# Patient Record
Sex: MALE | Race: WHITE | NOT HISPANIC OR LATINO | Employment: OTHER | URBAN - METROPOLITAN AREA
[De-identification: names, ages, dates, MRNs, and addresses within clinical notes are randomized per-mention and may not be internally consistent; named-entity substitution may affect disease eponyms.]

---

## 2017-03-29 ENCOUNTER — PATIENT MESSAGE (OUTPATIENT)
Dept: RESEARCH | Facility: HOSPITAL | Age: 62
End: 2017-03-29

## 2017-05-02 ENCOUNTER — TELEPHONE (OUTPATIENT)
Dept: CARDIOLOGY | Facility: CLINIC | Age: 62
End: 2017-05-02

## 2017-08-24 ENCOUNTER — TELEPHONE (OUTPATIENT)
Dept: GASTROENTEROLOGY | Facility: CLINIC | Age: 62
End: 2017-08-24

## 2017-08-24 DIAGNOSIS — Z86.010 HISTORY OF COLON POLYPS: Primary | ICD-10-CM

## 2017-08-24 RX ORDER — SODIUM, POTASSIUM,MAG SULFATES 17.5-3.13G
SOLUTION, RECONSTITUTED, ORAL ORAL
Qty: 1 BOTTLE | Refills: 0 | Status: ON HOLD | OUTPATIENT
Start: 2017-08-24 | End: 2017-09-29 | Stop reason: ALTCHOICE

## 2017-08-24 NOTE — TELEPHONE ENCOUNTER
History of colon polyps  -     sodium,potassium,mag sulfates (SUPREP BOWEL PREP KIT) 17.5-3.13-1.6 gram SolR; As directed  Dispense: 1 Bottle; Refill: 0  -     Case request GI: COLONOSCOPY      Cole Lopez

## 2017-09-29 ENCOUNTER — SURGERY (OUTPATIENT)
Age: 62
End: 2017-09-29

## 2017-09-29 ENCOUNTER — HOSPITAL ENCOUNTER (OUTPATIENT)
Facility: HOSPITAL | Age: 62
Discharge: HOME | End: 2017-09-29
Attending: INTERNAL MEDICINE | Admitting: INTERNAL MEDICINE
Payer: COMMERCIAL

## 2017-09-29 ENCOUNTER — PATIENT MESSAGE (OUTPATIENT)
Dept: GASTROENTEROLOGY | Facility: HOSPITAL | Age: 62
End: 2017-09-29

## 2017-09-29 ENCOUNTER — ANESTHESIA EVENT (OUTPATIENT)
Dept: ENDOSCOPY | Facility: HOSPITAL | Age: 62
End: 2017-09-29
Payer: COMMERCIAL

## 2017-09-29 ENCOUNTER — ANESTHESIA (OUTPATIENT)
Dept: ENDOSCOPY | Facility: HOSPITAL | Age: 62
End: 2017-09-29
Payer: COMMERCIAL

## 2017-09-29 VITALS
WEIGHT: 189 LBS | BODY MASS INDEX: 30.37 KG/M2 | SYSTOLIC BLOOD PRESSURE: 142 MMHG | OXYGEN SATURATION: 99 % | TEMPERATURE: 99 F | DIASTOLIC BLOOD PRESSURE: 71 MMHG | HEIGHT: 66 IN | RESPIRATION RATE: 18 BRPM | HEART RATE: 52 BPM | RESPIRATION RATE: 28 BRPM

## 2017-09-29 DIAGNOSIS — K57.30 DIVERTICULOSIS OF LARGE INTESTINE WITHOUT HEMORRHAGE: ICD-10-CM

## 2017-09-29 DIAGNOSIS — Z86.010 PERSONAL HISTORY OF COLONIC POLYPS: ICD-10-CM

## 2017-09-29 DIAGNOSIS — Z86.010 HISTORY OF COLON POLYPS: Primary | ICD-10-CM

## 2017-09-29 PROCEDURE — 25000003 PHARM REV CODE 250: Performed by: INTERNAL MEDICINE

## 2017-09-29 PROCEDURE — 37000009 HC ANESTHESIA EA ADD 15 MINS: Performed by: INTERNAL MEDICINE

## 2017-09-29 PROCEDURE — 25000003 PHARM REV CODE 250: Performed by: NURSE ANESTHETIST, CERTIFIED REGISTERED

## 2017-09-29 PROCEDURE — 63600175 PHARM REV CODE 636 W HCPCS: Performed by: NURSE ANESTHETIST, CERTIFIED REGISTERED

## 2017-09-29 PROCEDURE — G0105 COLORECTAL SCRN; HI RISK IND: HCPCS | Performed by: INTERNAL MEDICINE

## 2017-09-29 PROCEDURE — 37000008 HC ANESTHESIA 1ST 15 MINUTES: Performed by: INTERNAL MEDICINE

## 2017-09-29 PROCEDURE — G0105 COLORECTAL SCRN; HI RISK IND: HCPCS | Mod: ,,, | Performed by: INTERNAL MEDICINE

## 2017-09-29 RX ORDER — PROPOFOL 10 MG/ML
VIAL (ML) INTRAVENOUS
Status: DISCONTINUED | OUTPATIENT
Start: 2017-09-29 | End: 2017-09-29

## 2017-09-29 RX ORDER — SODIUM CHLORIDE, SODIUM LACTATE, POTASSIUM CHLORIDE, CALCIUM CHLORIDE 600; 310; 30; 20 MG/100ML; MG/100ML; MG/100ML; MG/100ML
INJECTION, SOLUTION INTRAVENOUS CONTINUOUS PRN
Status: DISCONTINUED | OUTPATIENT
Start: 2017-09-29 | End: 2017-09-29

## 2017-09-29 RX ORDER — LIDOCAINE HYDROCHLORIDE 10 MG/ML
INJECTION INFILTRATION; PERINEURAL
Status: DISCONTINUED | OUTPATIENT
Start: 2017-09-29 | End: 2017-09-29

## 2017-09-29 RX ORDER — SODIUM CHLORIDE, SODIUM LACTATE, POTASSIUM CHLORIDE, CALCIUM CHLORIDE 600; 310; 30; 20 MG/100ML; MG/100ML; MG/100ML; MG/100ML
INJECTION, SOLUTION INTRAVENOUS
Status: COMPLETED | OUTPATIENT
Start: 2017-09-29 | End: 2017-09-29

## 2017-09-29 RX ADMIN — LIDOCAINE HYDROCHLORIDE 50 MG: 10 INJECTION, SOLUTION INFILTRATION; PERINEURAL at 09:09

## 2017-09-29 RX ADMIN — PROPOFOL 20 MG: 10 INJECTION, EMULSION INTRAVENOUS at 09:09

## 2017-09-29 RX ADMIN — PROPOFOL 30 MG: 10 INJECTION, EMULSION INTRAVENOUS at 09:09

## 2017-09-29 RX ADMIN — SODIUM CHLORIDE, SODIUM LACTATE, POTASSIUM CHLORIDE, AND CALCIUM CHLORIDE: .6; .31; .03; .02 INJECTION, SOLUTION INTRAVENOUS at 08:09

## 2017-09-29 RX ADMIN — SODIUM CHLORIDE, SODIUM LACTATE, POTASSIUM CHLORIDE, AND CALCIUM CHLORIDE: 600; 310; 30; 20 INJECTION, SOLUTION INTRAVENOUS at 09:09

## 2017-09-29 RX ADMIN — PROPOFOL 50 MG: 10 INJECTION, EMULSION INTRAVENOUS at 09:09

## 2017-09-29 NOTE — PLAN OF CARE
Problem: Pain, Acute (Adult)  Goal: Acceptable Pain Control/Comfort Level  Patient will demonstrate the desired outcomes by discharge/transition of care.  Outcome: Outcome(s) achieved Date Met: 09/29/17  Pt denies c/o discomfort, dc instructions reviewed, criteria met, iv dc'd tolerated well no bleeding noted, dc to [facility] via [facility] with fmly

## 2017-09-29 NOTE — DISCHARGE SUMMARY
Endoscopy Discharge Summary      Admit Date: 9/29/2017    Discharge Date and Time:  9/29/2017 9:33 AM    Attending Physician: Cole Lopez MD     Discharge Physician: Cole Lopez MD     Principal Admitting Diagnoses: History of colon polyps         Discharge Diagnosis: The primary encounter diagnosis was History of colon polyps. Diagnoses of Personal history of colonic polyps and Diverticulosis of large intestine without hemorrhage were also pertinent to this visit.     Discharged Condition: Good    Indication for Admission: History of colon polyps     Hospital Course: Patient was admitted for an inpatient procedure and tolerated the procedure well with no complications.    Significant Diagnostic Studies: EGD    Pathology (if any):  Specimen (12h ago through future)    None          Disposition: Home.    Bleeding: None    No Implants         Follow Up/Patient Instructions:   Current Discharge Medication List      CONTINUE these medications which have NOT CHANGED    Details   alclomethasone (ACLOVATE) 0.05 % cream Apply topically Three times daily as needed. aaa bid      ascorbic acid (VITAMIN C) 1000 MG tablet Take 1 tablet by mouth Daily.      atenolol (TENORMIN) 25 MG tablet Taking half tablet daily  Qty: 30 tablet, Refills: 5    Associated Diagnoses: HTN (hypertension); CAD (coronary artery disease)      CALCIUM ORAL Take by mouth 2 (two) times daily.      co-enzyme Q-10 30 mg capsule Take 30 mg by mouth once daily.      doxazosin (CARDURA) 2 MG tablet Take 1 tablet by mouth Daily. Taking half tablet      levocarnitine (L-CARNITINE) 500 mg Tab Take by mouth once daily.      OMEGA-3 FATTY ACIDS/FISH OIL (OMEGA 3 FISH OIL ORAL)       rosuvastatin (CRESTOR) 40 MG Tab Take 10 mg by mouth every evening.      saw palmetto 160 MG capsule Take 1 capsule by mouth Daily.      temazepam (RESTORIL) 15 mg capsule Take 1 capsule by mouth At bedtime as needed.      TENS units Bettie Disp 1 Freedom TENS with disposable  electrodes  Qty: 1 Device, Refills: 0    Associated Diagnoses: Myofascial pain; Rotator cuff impingement syndrome      tizanidine (ZANAFLEX) 2 MG tablet Take 1 tablet (2 mg total) by mouth 2 (two) times daily as needed.  Qty: 60 tablet, Refills: 11    Associated Diagnoses: Myofascial pain      aspirin (ASPIRIN LOW DOSE) 81 MG EC tablet Take 1 tablet by mouth Daily.         STOP taking these medications       diclofenac (VOLTAREN) 50 MG EC tablet Comments:   Reason for Stopping:         glucosamine sulfate 2KCl (GLUCOSAMINE RELIEF) 1,000 mg Tab Comments:   Reason for Stopping:                 Discharge Procedure Orders  Diet general     Activity as tolerated     Call MD for:  temperature >100.4     Call MD for:  persistent nausea and vomiting     Call MD for:  severe uncontrolled pain     Call MD for:  difficulty breathing, headache or visual disturbances     Call MD for:  redness, tenderness, or signs of infection (pain, swelling, redness, odor or green/yellow discharge around incision site)     Call MD for:  hives     Call MD for:  persistent dizziness or light-headedness     No dressing needed         Follow-up Information     Eduin Grider MD.    Specialty:  Family Medicine  Why:  As needed  Contact information:  [address] [place] [id number] [phone]

## 2017-09-29 NOTE — DISCHARGE INSTRUCTIONS
If you develop fevers, severe abdominal pain, significant bleeding, nausea or vomiting, please contact us or come to our Emergency Department at [facility].  Our  contact number is [phone] available for emergencies or any question that you may have.      You may return to normal activities tomorrow.  Written discharge instructions were provided to you today and have them handy since you may not remember our conversation today.       I also recommend that you follow a high fiber diet and take calcium supplements daily as well as to continue your present medications.      If you take Aspirin, please resume taking it at your prior dose today. If we obtained biopsies or removed polyps during your procedure, we will contact you within 5 to 6 days with the results.      Thanks for trusting us with your healthcare needs.      Sincerely,     Cole Lopez M.D.        To rate your experience with Dr. Lopez, please click on the link below     [email or web address]

## 2017-09-29 NOTE — ANESTHESIA PREPROCEDURE EVALUATION
09/29/2017  Abdi Oconnor is a 61 y.o., male.    Anesthesia Evaluation    I have reviewed the Patient Summary Reports.    I have reviewed the Nursing Notes.      Review of Systems  Anesthesia Hx:  No problems with previous Anesthesia Denies Hx of Anesthetic complications  History of prior surgery of interest to airway management or planning: Previous anesthesia: General, MAC Denies Family Hx of Anesthesia complications.   Denies Personal Hx of Anesthesia complications.   Social:  Non-Smoker, Social Alcohol Use    Hematology/Oncology:  Hematology Normal   Oncology Normal     EENT/Dental:EENT/Dental Normal   Cardiovascular:   Hypertension, well controlled Past MI CAD asymptomatic     Pulmonary:   Sleep Apnea    Renal/:  Renal/ Normal     Hepatic/GI:  Hepatic/GI Normal    Musculoskeletal:   Arthritis     Neurological:   Neuromuscular Disease,    Endocrine:  Endocrine Normal    Dermatological:  Skin Normal    Psych:  Psychiatric Normal           Physical Exam  General:  Well nourished    Airway/Jaw/Neck:  Airway Findings: Mouth Opening: Normal Tongue: Normal  General Airway Assessment: Adult  Mallampati: II  TM Distance: Normal, at least 6 cm      Dental:  Dental Findings: In tact   Chest/Lungs:  Chest/Lungs Findings: Clear to auscultation, Normal Respiratory Rate     Heart/Vascular:  Heart Findings: Rate: Normal  Rhythm: Regular Rhythm  Sounds: Normal        Mental Status:  Mental Status Findings:  Cooperative, Alert and Oriented         Anesthesia Plan  Type of Anesthesia, risks & benefits discussed:  Anesthesia Type:  MAC  Patient's Preference:   Intra-op Monitoring Plan: standard ASA monitors  Intra-op Monitoring Plan Comments:   Post Op Pain Control Plan:   Post Op Pain Control Plan Comments:   Induction:   IV  Beta Blocker:  Patient is on a Beta-Blocker and has received one dose within the past  24 hours (No further documentation required).       Informed Consent: Patient understands risks and agrees with Anesthesia plan.  Questions answered. Anesthesia consent signed with patient.  ASA Score: 3     Day of Surgery Review of History & Physical:            Ready For Surgery From Anesthesia Perspective.

## 2017-09-29 NOTE — ANESTHESIA POSTPROCEDURE EVALUATION
"Anesthesia Post Evaluation    Patient: Abdi Oconnor    Procedure(s) Performed: Procedure(s) (LRB):  COLONOSCOPY (Left)    Final Anesthesia Type: MAC  Patient location during evaluation: GI PACU  Patient participation: Yes- Able to Participate  Level of consciousness: awake and alert  Post-procedure vital signs: reviewed and stable  Pain management: adequate  Airway patency: patent  PONV status at discharge: No PONV  Anesthetic complications: no      Cardiovascular status: hemodynamically stable and blood pressure returned to baseline  Respiratory status: room air, unassisted and spontaneous ventilation  Hydration status: euvolemic  Follow-up not needed.        Visit Vitals  BP (!) 146/67 (BP Location: Left arm, Patient Position: Sitting)   Pulse (!) 46   Temp 36.9 °C (98.5 °F) (Oral)   Resp 16   Ht 5' 6" (1.676 m)   Wt 85.7 kg (189 lb)   SpO2 97%   BMI 30.51 kg/m²       Pain/Sherri Score: Pain Assessment Performed: Yes (9/29/2017  8:41 AM)  Presence of Pain: denies (9/29/2017  8:41 AM)      "

## 2017-09-29 NOTE — TRANSFER OF CARE
"Anesthesia Transfer of Care Note    Patient: Abdi Oconnor    Procedure(s) Performed: Procedure(s) (LRB):  COLONOSCOPY (Left)    Patient location: Other: GI PACU    Anesthesia Type: MAC    Transport from OR: Transported from OR on room air with adequate spontaneous ventilation    Post pain: adequate analgesia    Post assessment: no apparent anesthetic complications    Post vital signs: stable    Level of consciousness: awake    Nausea/Vomiting: no nausea/vomiting    Complications: none    Transfer of care protocol was followed      Last vitals:   Visit Vitals  BP (!) 146/67 (BP Location: Left arm, Patient Position: Sitting)   Pulse (!) 46   Temp 36.9 °C (98.5 °F) (Oral)   Resp 16   Ht 5' 6" (1.676 m)   Wt 85.7 kg (189 lb)   SpO2 97%   BMI 30.51 kg/m²     "

## 2017-09-29 NOTE — ANESTHESIA RELEASE NOTE
"Anesthesia Release from PACU Note    Patient: Abdi Oconnor    Procedure(s) Performed: Procedure(s) (LRB):  COLONOSCOPY (Left)    Anesthesia type: MAC    Post pain: Adequate analgesia    Post assessment: no apparent anesthetic complications, tolerated procedure well and no evidence of recall    Last Vitals:   Visit Vitals  BP (!) 146/67 (BP Location: Left arm, Patient Position: Sitting)   Pulse (!) 46   Temp 36.9 °C (98.5 °F) (Oral)   Resp 16   Ht 5' 6" (1.676 m)   Wt 85.7 kg (189 lb)   SpO2 97%   BMI 30.51 kg/m²       Post vital signs: stable    Level of consciousness: awake    Nausea/Vomiting: no nausea/no vomiting    Complications: none    Airway Patency: patent    Respiratory: unassisted, spontaneous ventilation, room air    Cardiovascular: stable and blood pressure at baseline    Hydration: euvolemic  "

## 2017-09-29 NOTE — H&P
[facility]  Gastroenterology  H&P    Patient Name: Abdi Oconnor  MRN: [id number]  Admission Date: 9/29/2017  Code Status: No Order    Attending Provider: Cole Lopez MD   Primary Care Physician: Eduin Grider MD  Principal Problem:History of colon polyps    Subjective:     History of Present Illness/Reasons for procedure(s): Patient here for surveillance colonoscopy.     Past Medical History:   Diagnosis Date    Acute coronary syndrome     CAD (coronary artery disease)     DJD (degenerative joint disease) of knee     HLD (hyperlipidemia)     HTN (hypertension)     MI (myocardial infarction)     history of    Sleep apnea     Syncope and collapse        No current facility-administered medications on file prior to encounter.      Current Outpatient Prescriptions on File Prior to Encounter   Medication Sig Dispense Refill    alclomethasone (ACLOVATE) 0.05 % cream Apply topically Three times daily as needed. aaa bid      ascorbic acid (VITAMIN C) 1000 MG tablet Take 1 tablet by mouth Daily.      atenolol (TENORMIN) 25 MG tablet Taking half tablet daily 30 tablet 5    CALCIUM ORAL Take by mouth 2 (two) times daily.      co-enzyme Q-10 30 mg capsule Take 30 mg by mouth once daily.      doxazosin (CARDURA) 2 MG tablet Take 1 tablet by mouth Daily. Taking half tablet      levocarnitine (L-CARNITINE) 500 mg Tab Take by mouth once daily.      OMEGA-3 FATTY ACIDS/FISH OIL (OMEGA 3 FISH OIL ORAL)       rosuvastatin (CRESTOR) 40 MG Tab Take 10 mg by mouth every evening.      saw palmetto 160 MG capsule Take 1 capsule by mouth Daily.      temazepam (RESTORIL) 15 mg capsule Take 1 capsule by mouth At bedtime as needed.      TENS units Bettie Disp 1 Freedom TENS with disposable electrodes 1 Device 0    tizanidine (ZANAFLEX) 2 MG tablet Take 1 tablet (2 mg total) by mouth 2 (two) times daily as needed. 60 tablet 11    aspirin (ASPIRIN LOW DOSE) 81 MG EC tablet Take 1 tablet by mouth  Daily.      diclofenac (VOLTAREN) 50 MG EC tablet Take 1-2 tablets ( mg total) by mouth 2 (two) times daily. 120 tablet 5    glucosamine sulfate 2KCl (GLUCOSAMINE RELIEF) 1,000 mg Tab Take 2 tablets by mouth Daily.      [DISCONTINUED] sodium,potassium,mag sulfates (SUPREP BOWEL PREP KIT) 17.5-3.13-1.6 gram SolR As directed 1 Bottle 0       Past Surgical History:   Procedure Laterality Date    CARDIAC CATHETERIZATION      KNEE SURGERY      bilatera       Review of patient's allergies indicates:   Allergen Reactions    Synvisc [hylan g-f 20] Swelling     Pt reports adverse reaction to orthovisc and Synvisc.      Family History     Problem Relation (Age of Onset)    Cancer     Colon cancer Paternal Uncle    Hypertension         Social History Main Topics    Smoking status: Former Smoker     Types: Cigarettes    Smokeless tobacco: Never Used      Comment: occasionally smoke cigars    Alcohol use 4.2 oz/week     7 Glasses of wine per week      Comment: socially    Drug use: No    Sexual activity: Not on file     Review of Systems   Constitutional: Negative for activity change, appetite change, fatigue, fever and unexpected weight change.   HENT: Negative for congestion, ear pain, hearing loss, mouth sores, trouble swallowing and voice change.    Eyes: Negative for pain, redness and itching.   Respiratory: Negative for apnea, cough, choking, chest tightness, shortness of breath and wheezing.    Cardiovascular: Negative for chest pain, palpitations and leg swelling.   Gastrointestinal: Negative for abdominal distention, abdominal pain, anal bleeding, blood in stool, constipation, diarrhea, nausea and vomiting.   Endocrine: Negative for cold intolerance, heat intolerance and polyphagia.   Genitourinary: Negative for dysuria, hematuria and urgency.   Musculoskeletal: Negative for arthralgias, joint swelling and neck pain.   Skin: Negative for pallor and rash.   Allergic/Immunologic: Negative for  environmental allergies and food allergies.   Neurological: Negative for dizziness, facial asymmetry and light-headedness.   Hematological: Negative for adenopathy. Does not bruise/bleed easily.   Psychiatric/Behavioral: Negative for agitation, behavioral problems, confusion and decreased concentration.     Objective:     Vital Signs (Most Recent):  Temp: 98.5 °F (36.9 °C) (09/29/17 0835)  Pulse: (!) 46 (09/29/17 0835)  Resp: 16 (09/29/17 0835)  BP: (!) 146/67 (09/29/17 0835)  SpO2: 97 % (09/29/17 0835) Vital Signs (24h Range):  Temp:  [98.5 °F (36.9 °C)] 98.5 °F (36.9 °C)  Pulse:  [46] 46  Resp:  [16] 16  SpO2:  [97 %] 97 %  BP: (146)/(67) 146/67     Weight: 85.7 kg (189 lb) (09/29/17 0835)  Body mass index is 30.51 kg/m².    No intake or output data in the 24 hours ending 09/29/17 0855    Lines/Drains/Airways     Peripheral Intravenous Line                 Peripheral IV - Single Lumen 09/29/17 0849 Right Hand less than 1 day                Physical Exam   Constitutional: He is oriented to person, place, and time. He appears well-developed and well-nourished.   HENT:   Head: Normocephalic and atraumatic.   Eyes: Conjunctivae are normal. Pupils are equal, round, and reactive to light.   Neck: Normal range of motion. Neck supple. No tracheal deviation present. No thyromegaly present.   Cardiovascular: Normal rate and regular rhythm.    Pulmonary/Chest: Effort normal and breath sounds normal. He has no wheezes. He has no rales. He exhibits no tenderness.   Abdominal: Soft. Bowel sounds are normal. He exhibits no distension and no mass. There is no tenderness. There is no guarding.   Musculoskeletal: Normal range of motion.   Lymphadenopathy:     He has no cervical adenopathy.   Neurological: He is alert and oriented to person, place, and time. No cranial nerve deficit.   Skin: Skin is warm and dry.   Psychiatric: He has a normal mood and affect. His behavior is normal.   Nursing note and vitals reviewed.      Lab  Summary Latest Ref Rng & Units 5/12/2014 2/5/2015   Hemoglobin - (None) (None)   Hematocrit - (None) (None)   White count - (None) (None)   Platelet count - (None) (None)   Sodium - (None) (None)   Potassium - (None) (None)   Calcium - (None) (None)   Phosphorus - (None) (None)   Creatinine - (None) (None)   AST 10 - 40 U/L 18 16   Alk Phos 55 - 135 U/L 80 74   Bilirubin 0.1 - 1.0 mg/dL 0.7 0.3   Glucose - (None) (None)   Cholesterol 120 - 199 mg/dL 251(H) 308(H)   HDL cholesterol 40 - 75 mg/dL 52 41   Triglycerides 30 - 150 mg/dL 109 122   LDL calc - (None) (None)   Total protein 6.0 - 8.4 g/dL 7.2 6.6   Albumin 3.5 - 5.2 g/dL 4.1 3.7   A1C - (None) (None)   PSA Screen - (None) (None)   Some recent data might be hidden        Assessment/Plan:     Active Diagnoses:    Diagnosis Date Noted POA    PRINCIPAL PROBLEM:  History of colon polyps [Z86.010] 08/24/2017 Not Applicable    Personal history of colonic polyps [Z86.010] 09/29/2017 Not Applicable      Problems Resolved During this Admission:    Diagnosis Date Noted Date Resolved POA       Risks, benefits and alternative options were discussed with the patient. Risks including but not limited to infection, bleeding, heart or respiratory problems and perforation that may require surgery were all explained to the patient. The patient had a chance for questions if there were doubts or concerns about the test. The referring provider will be notified that our consultation is complete and available through the patient's records.    Thanks for letting us participate in the care of this nice patient,      Cole Lopez MD  Gastroenterology  [facility]